# Patient Record
Sex: MALE | Race: WHITE
[De-identification: names, ages, dates, MRNs, and addresses within clinical notes are randomized per-mention and may not be internally consistent; named-entity substitution may affect disease eponyms.]

---

## 2017-10-17 ENCOUNTER — HOSPITAL ENCOUNTER (EMERGENCY)
Dept: HOSPITAL 1 - ED | Age: 47
Discharge: HOME | End: 2017-10-17
Payer: COMMERCIAL

## 2017-10-17 VITALS — SYSTOLIC BLOOD PRESSURE: 138 MMHG | DIASTOLIC BLOOD PRESSURE: 91 MMHG

## 2017-10-17 VITALS — WEIGHT: 265 LBS | BODY MASS INDEX: 32.27 KG/M2 | HEIGHT: 76 IN
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
## 2017-10-17 DIAGNOSIS — N40.0: ICD-10-CM
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
## 2017-10-17 DIAGNOSIS — S90.211A: Primary | ICD-10-CM

## 2017-11-02 ENCOUNTER — HOSPITAL ENCOUNTER (EMERGENCY)
Dept: HOSPITAL 1 - ED | Age: 47
Discharge: HOME | End: 2017-11-02
Payer: COMMERCIAL

## 2017-11-02 VITALS — DIASTOLIC BLOOD PRESSURE: 102 MMHG | SYSTOLIC BLOOD PRESSURE: 150 MMHG

## 2017-11-02 DIAGNOSIS — I10: ICD-10-CM

## 2017-11-02 DIAGNOSIS — Z79.4: ICD-10-CM

## 2017-11-02 DIAGNOSIS — Z91.018: ICD-10-CM

## 2017-11-02 DIAGNOSIS — E11.9: ICD-10-CM

## 2017-11-02 DIAGNOSIS — S90.212D: Primary | ICD-10-CM

## 2017-11-02 DIAGNOSIS — Z88.1: ICD-10-CM
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
## 2018-05-27 ENCOUNTER — HOSPITAL ENCOUNTER (EMERGENCY)
Dept: HOSPITAL 26 - MED | Age: 48
Discharge: HOME | End: 2018-05-27
Payer: COMMERCIAL

## 2018-05-27 VITALS — SYSTOLIC BLOOD PRESSURE: 170 MMHG | DIASTOLIC BLOOD PRESSURE: 98 MMHG

## 2018-05-27 VITALS — DIASTOLIC BLOOD PRESSURE: 76 MMHG | SYSTOLIC BLOOD PRESSURE: 152 MMHG

## 2018-05-27 VITALS — BODY MASS INDEX: 29.52 KG/M2 | WEIGHT: 250 LBS | HEIGHT: 77 IN

## 2018-05-27 DIAGNOSIS — Z88.1: ICD-10-CM

## 2018-05-27 DIAGNOSIS — B80: ICD-10-CM
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
## 2018-05-27 DIAGNOSIS — E11.9: ICD-10-CM

## 2018-05-27 DIAGNOSIS — S30.861A: Primary | ICD-10-CM

## 2018-05-27 DIAGNOSIS — S40.862A: ICD-10-CM

## 2018-05-27 DIAGNOSIS — S40.861A: ICD-10-CM
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
## 2018-05-27 NOTE — NUR
Patient discharged with v/s stable. Written and verbal after care instructions 
given and explained. 

Patient alert, oriented and verbalized understanding of instructions. 
Ambulatory with steady gait. All questions addressed prior to discharge. ID 
band removed. Patient advised to follow up with PMD. Rx of 
ALBENZA,TRIAMCINOLONE,PREDNISONE given. Patient educated on indication of 
medication including possible reaction and side effects. Opportunity to ask 
questions provided and answered.

## 2018-05-27 NOTE — NUR
PT COMES TO ER WITH C/O GENERALIZED BODY RASH FOR COUPLE OF WEEKS, PRURITIS, 
DENIES PAIN. REPORTS HE WORKS WITH [employer] AND [employer]. DENIES FEVERS/CHILLS. RESP EVEN 
AND UNLABORED, IN NAD. RASH NOTED-RED CIRCULAR IN SHAPE. NO DRAINAGE NOTED. 



MED HX; NONE

MEDS: NONE

## 2018-06-03 ENCOUNTER — HOSPITAL ENCOUNTER (EMERGENCY)
Dept: HOSPITAL 26 - MED | Age: 48
LOS: 1 days | Discharge: HOME | End: 2018-06-04
Payer: COMMERCIAL

## 2018-06-03 VITALS — HEIGHT: 77 IN | WEIGHT: 250 LBS | BODY MASS INDEX: 29.52 KG/M2

## 2018-06-03 VITALS — DIASTOLIC BLOOD PRESSURE: 100 MMHG | SYSTOLIC BLOOD PRESSURE: 155 MMHG

## 2018-06-03 DIAGNOSIS — Z88.1: ICD-10-CM

## 2018-06-03 DIAGNOSIS — L29.9: Primary | ICD-10-CM

## 2018-06-04 VITALS — SYSTOLIC BLOOD PRESSURE: 145 MMHG | DIASTOLIC BLOOD PRESSURE: 97 MMHG

## 2018-06-04 LAB
BARBITURATES UR QL SCN: (no result) NG/ML
BENZODIAZ UR QL SCN: (no result) NG/ML
BZE UR QL SCN: (no result) NG/ML
CANNABINOIDS UR QL SCN: (no result) NG/ML
OPIATES UR QL SCN: (no result) NG/ML
PCP UR QL SCN: (no result) NG/ML

## 2018-06-04 NOTE — NUR
PATIENT C/O WORMS COMING OUT OF HIS RECTUM AND R UPPER AND LOWER QUADRANT ABD 
PAIN RADIATING TO HIS BACK 7/10 PRESSURE STARTING TODAY. PATIENT STATES 2 DAYS 
AGO HE HAD 1 EPISODE OF DIARRHEA WITH WORMS WHICH HE WAS TREATED FOR. PATIENT 
IS ALERT AND ORIENTED IN NO ACUTE DISTRESS, WILL CONTINUE TO MONITOR CLOSELY.
